# Patient Record
Sex: FEMALE | Race: AMERICAN INDIAN OR ALASKA NATIVE
[De-identification: names, ages, dates, MRNs, and addresses within clinical notes are randomized per-mention and may not be internally consistent; named-entity substitution may affect disease eponyms.]

---

## 2019-08-19 ENCOUNTER — HOSPITAL ENCOUNTER (EMERGENCY)
Dept: HOSPITAL 5 - ED | Age: 19
Discharge: HOME | End: 2019-08-19
Payer: SELF-PAY

## 2019-08-19 VITALS — DIASTOLIC BLOOD PRESSURE: 73 MMHG | SYSTOLIC BLOOD PRESSURE: 118 MMHG

## 2019-08-19 DIAGNOSIS — N94.6: Primary | ICD-10-CM

## 2019-08-19 DIAGNOSIS — Z32.02: ICD-10-CM

## 2019-08-19 DIAGNOSIS — Z79.899: ICD-10-CM

## 2019-08-19 LAB
BILIRUB UR QL STRIP: (no result)
BLOOD UR QL VISUAL: (no result)
MUCOUS THREADS #/AREA URNS HPF: (no result) /HPF
PH UR STRIP: 5 [PH] (ref 5–7)
PROT UR STRIP-MCNC: (no result) MG/DL
RBC #/AREA URNS HPF: 2 /HPF (ref 0–6)
UROBILINOGEN UR-MCNC: < 2 MG/DL (ref ?–2)
WBC #/AREA URNS HPF: 1 /HPF (ref 0–6)

## 2019-08-19 PROCEDURE — 81001 URINALYSIS AUTO W/SCOPE: CPT

## 2019-08-19 PROCEDURE — 81025 URINE PREGNANCY TEST: CPT

## 2019-08-19 NOTE — EMERGENCY DEPARTMENT REPORT
ED Female  HPI





- General


Chief complaint: Abdominal Pain


Stated complaint: LOWER ABD PAIN


Time Seen by Provider: 08/19/19 02:35


Source: patient


Mode of arrival: Ambulatory


Limitations: No Limitations





- History of Present Illness


MD Complaint: pelvic pain


-: days(s) (2)


Location: suprapubic


Radiation: suprapubic


Severity: mild


Quality: aching


Consistency: constant


Improves with: none


Worsens with: none


Associated Symptoms: denies: vaginal discharge, vaginal bleeding, abdominal 

pain, nausea/vomiting, loss of appetite, dysuria, hematuria, shortness of 

breath, syncope, weakness





- Related Data


                                  Previous Rx's











 Medication  Instructions  Recorded  Last Taken  Type


 


HYDROcodone/APAP  [Norco 1 each PO Q8HR PRN #15 tablet 08/14/16 Unknown Rx





10/325]    


 


Silver Sulfadiazine [Ssd] 20 gm TP BID #1 applicatio 08/14/16 Unknown Rx


 


Sulfamethoxazole/Trimethoprim 1 each PO BID #10 tablet 08/14/16 Unknown Rx





[Bactrim DS TAB]    


 


Ketorolac [Toradol] 10 mg PO Q6H PRN #15 tablet 08/19/19 Unknown Rx











                                    Allergies











Allergy/AdvReac Type Severity Reaction Status Date / Time


 


No Known Allergies Allergy   Verified 08/14/16 00:34














ED Review of Systems


ROS: 


Stated complaint: LOWER ABD PAIN


Other details as noted in HPI





Comment: All other systems reviewed and negative





ED Past Medical Hx





- Past Medical History


Previous Medical History?: No





- Surgical History


Past Surgical History?: No





- Social History


Smoking Status: Never Smoker


Substance Use Type: None





- Medications


Home Medications: 


                                Home Medications











 Medication  Instructions  Recorded  Confirmed  Last Taken  Type


 


HYDROcodone/APAP  [Norco 1 each PO Q8HR PRN #15 tablet 08/14/16  Unknown 

Rx





10/325]     


 


Silver Sulfadiazine [Ssd] 20 gm TP BID #1 applicatio 08/14/16  Unknown Rx


 


Sulfamethoxazole/Trimethoprim 1 each PO BID #10 tablet 08/14/16  Unknown Rx





[Bactrim DS TAB]     


 


Ketorolac [Toradol] 10 mg PO Q6H PRN #15 tablet 08/19/19  Unknown Rx














ED Physical Exam





- General


Limitations: No Limitations


General appearance: alert, in no apparent distress





- Head


Head exam: Present: atraumatic, normocephalic





- Eye


Eye exam: Present: normal appearance, PERRL, EOMI


Pupils: Present: normal accommodation





- ENT


ENT exam: Present: normal exam, normal orophraynx, mucous membranes moist, TM's 

normal bilaterally





- Neck


Neck exam: Present: normal inspection, full ROM





- Respiratory


Respiratory exam: Present: normal lung sounds bilaterally.  Absent: respiratory 

distress





- Cardiovascular


Cardiovascular Exam: Present: regular rate, normal rhythm.  Absent: systolic 

murmur, diastolic murmur, rubs, gallop





- GI/Abdominal


GI/Abdominal exam: Present: soft, normal bowel sounds.  Absent: distended, 

guarding, rebound, rigid, hyperactive bowel sounds, organomegaly, mass, 

pulsatile mass





- Extremities Exam


Extremities exam: Present: normal inspection





- Back Exam


Back exam: Present: normal inspection





- Neurological Exam


Neurological exam: Present: alert, oriented X3, CN II-XII intact, normal gait





- Psychiatric


Psychiatric exam: Present: normal affect, normal mood





- Skin


Skin exam: Present: warm, dry, intact, normal color.  Absent: rash





ED Course





                                   Vital Signs











  08/19/19





  01:45


 


Temperature 98.8 F


 


Pulse Rate 87


 


Respiratory 16





Rate 


 


Blood Pressure 118/73


 


O2 Sat by Pulse 100





Oximetry 














ED Medical Decision Making





- Medical Decision Making





19-year-old -American female department complaining of right lower 

adnexal pain 2 days associated with nausea, vomiting.  She reports having some 

dysuria and some spotting suspicion for of pregnancy reports no vaginal 

dischargefor STD.  Urinalysis shows that there is no UTI present is negative.  

Patient states she may just be started her cycle, which appears to be the case 

at this time.  She can tolerate orals no complications.  Post no fevers, chills,

 sweats, nausea, vomiting


Critical care attestation.: 


If time is entered above; I have spent that time in minutes in the direct care 

of this critically ill patient, excluding procedure time.








ED Disposition


Clinical Impression: 


 Negative pregnancy test, Menses painful





Disposition: DC-01 TO HOME OR SELFCARE


Is pt being admited?: No


Does the pt Need Aspirin: No


Condition: Stable


Instructions:  Abdominal Pain (ED)


Prescriptions: 


Ketorolac [Toradol] 10 mg PO Q6H PRN #15 tablet


 PRN Reason: Pain


Referrals: 


LISA KEMP MD [Primary Care Provider] - 3-5 Days